# Patient Record
Sex: MALE | Race: OTHER | HISPANIC OR LATINO | URBAN - METROPOLITAN AREA
[De-identification: names, ages, dates, MRNs, and addresses within clinical notes are randomized per-mention and may not be internally consistent; named-entity substitution may affect disease eponyms.]

---

## 2019-07-14 ENCOUNTER — EMERGENCY (EMERGENCY)
Facility: HOSPITAL | Age: 64
LOS: 1 days | Discharge: HOME | End: 2019-07-14
Admitting: EMERGENCY MEDICINE
Payer: MEDICAID

## 2019-07-14 VITALS
RESPIRATION RATE: 16 BRPM | DIASTOLIC BLOOD PRESSURE: 92 MMHG | OXYGEN SATURATION: 95 % | TEMPERATURE: 98 F | HEART RATE: 82 BPM | SYSTOLIC BLOOD PRESSURE: 151 MMHG

## 2019-07-14 DIAGNOSIS — H10.32 UNSPECIFIED ACUTE CONJUNCTIVITIS, LEFT EYE: ICD-10-CM

## 2019-07-14 DIAGNOSIS — H57.11 OCULAR PAIN, RIGHT EYE: ICD-10-CM

## 2019-07-14 PROCEDURE — 99283 EMERGENCY DEPT VISIT LOW MDM: CPT

## 2019-07-14 RX ORDER — TOBRAMYCIN AND DEXAMETHASONE 1; 3 MG/ML; MG/ML
2 SUSPENSION/ DROPS OPHTHALMIC
Qty: 1 | Refills: 0
Start: 2019-07-14 | End: 2019-07-17

## 2019-07-14 RX ORDER — TOBRAMYCIN 0.3 %
1 DROPS OPHTHALMIC (EYE) ONCE
Refills: 0 | Status: COMPLETED | OUTPATIENT
Start: 2019-07-14 | End: 2019-07-14

## 2019-07-14 RX ADMIN — Medication 1 DROP(S): at 13:17

## 2019-07-14 NOTE — ED PROVIDER NOTE - OBJECTIVE STATEMENT
62 yo male with PMHx of Asymptomatic HIV infection, Hypothyroidism, unspecified type, and Migraine without aura and without status migrainosus, not intractable presenting to the ED 64 yo male with PMHx of Asymptomatic HIV infection, Hypothyroidism, unspecified type, and Migraine without aura and without status migrainosus, not intractable presenting to the ED with right eye redness, itchiness crusting and pain for two days. Patient denies sick contacts, fever, shaking chills. Patient states eye was crusted shut this morning, and is very itchy, also pain with eye movement.

## 2019-07-14 NOTE — ED PROVIDER NOTE - PMH
Asymptomatic HIV infection    Hypothyroidism, unspecified type    Migraine without aura and without status migrainosus, not intractable

## 2019-07-17 RX ORDER — OFLOXACIN 0.3 %
1 DROPS OPHTHALMIC (EYE)
Qty: 5 | Refills: 0
Start: 2019-07-17 | End: 2019-07-21

## 2019-07-18 RX ORDER — OFLOXACIN 0.3 %
1 DROPS OPHTHALMIC (EYE)
Qty: 5 | Refills: 0
Start: 2019-07-18 | End: 2019-07-22

## 2019-12-11 ENCOUNTER — EMERGENCY (EMERGENCY)
Facility: HOSPITAL | Age: 64
LOS: 1 days | Discharge: HOME | End: 2019-12-11
Attending: EMERGENCY MEDICINE | Admitting: EMERGENCY MEDICINE
Payer: MEDICAID

## 2019-12-11 VITALS
TEMPERATURE: 98 F | WEIGHT: 151.9 LBS | RESPIRATION RATE: 16 BRPM | SYSTOLIC BLOOD PRESSURE: 148 MMHG | OXYGEN SATURATION: 96 % | DIASTOLIC BLOOD PRESSURE: 84 MMHG | HEIGHT: 66 IN | HEART RATE: 76 BPM

## 2019-12-11 DIAGNOSIS — H57.12 OCULAR PAIN, LEFT EYE: ICD-10-CM

## 2019-12-11 DIAGNOSIS — H10.9 UNSPECIFIED CONJUNCTIVITIS: ICD-10-CM

## 2019-12-11 PROCEDURE — 99283 EMERGENCY DEPT VISIT LOW MDM: CPT

## 2019-12-11 RX ORDER — POLYMYXIN B SULF/TRIMETHOPRIM 10000-1/ML
1 DROPS OPHTHALMIC (EYE)
Qty: 1 | Refills: 0
Start: 2019-12-11 | End: 2019-12-17

## 2019-12-11 NOTE — ED PROVIDER NOTE - OBJECTIVE STATEMENT
65 y/o M with no PMHx presents to the ED for pain and swelling over the L eyelid. Pt reports his Sx started last night. He denies itching, injuries or trauma. 63 y/o M with no PMHx presents to the ED for pain and swelling over the L eyelid. Assoc with crusting discharge from L > R since this morning. Pt reports his Sx started last night. He denies itching, injuries or trauma.

## 2019-12-11 NOTE — ED PROVIDER NOTE - PATIENT PORTAL LINK FT
You can access the FollowMyHealth Patient Portal offered by [facility] by registering at the following website: [email or web address]. By joining [facility]’s FollowMyHealth portal, you will also be able to view your health information using other applications (apps) compatible with our system.

## 2019-12-11 NOTE — ED PROVIDER NOTE - EYES, MLM
Clear bilaterally. Normal anterior chambers. Vision unchanged. No orbital signs. Minimal L eyelid edema at the lash line. EOMI. No periorbital cellulitis.

## 2019-12-11 NOTE — ED PROVIDER NOTE - CLINICAL SUMMARY MEDICAL DECISION MAKING FREE TEXT BOX
65 y/o M presenting with L eyelid swelling and pain since last night. No indication for requirement of Antibiotic Tx at this time. Anticipate D/C home with prescription medications. 63 y/o M presenting with L eyelid swelling and pain since last night. No indication for requirement of Antibiotic Tx at this time. Symptoms consistent with Conjunctivitis. Return precautions given for  preseptal cellulitis. Will refer to Opthalmology for follow up. Anticipate D/C with prescription medications.

## 2019-12-11 NOTE — ED PROVIDER NOTE - NSFOLLOWUPCLINICS_GEN_ALL_ED_FT
[facility]hthalmology [facility]  Ophthalmology  [address], [address] [place] [address]  Phone: [phone]  Fax:   Follow Up Time:     [facility]  Ophthalmology  [address] [place] [address]  Phone: [phone]  Fax:   Follow Up Time:

## 2019-12-13 ENCOUNTER — NON-APPOINTMENT (OUTPATIENT)
Age: 64
End: 2019-12-13

## 2019-12-13 ENCOUNTER — APPOINTMENT (OUTPATIENT)
Dept: OPHTHALMOLOGY | Facility: CLINIC | Age: 64
End: 2019-12-13
Payer: MEDICAID

## 2019-12-13 PROBLEM — Z00.00 ENCOUNTER FOR PREVENTIVE HEALTH EXAMINATION: Noted: 2019-12-13

## 2019-12-13 PROCEDURE — 92004 COMPRE OPH EXAM NEW PT 1/>: CPT

## 2020-01-02 ENCOUNTER — APPOINTMENT (OUTPATIENT)
Dept: OPHTHALMOLOGY | Facility: CLINIC | Age: 65
End: 2020-01-02

## 2022-04-08 PROBLEM — Z78.9 OTHER SPECIFIED HEALTH STATUS: Chronic | Status: INACTIVE | Noted: 2019-12-11 | Resolved: 2019-12-14

## 2022-04-08 PROBLEM — Z78.9 OTHER SPECIFIED HEALTH STATUS: Chronic | Status: ACTIVE | Noted: 2019-12-14
